# Patient Record
Sex: MALE | Race: WHITE | NOT HISPANIC OR LATINO | Employment: FULL TIME | URBAN - METROPOLITAN AREA
[De-identification: names, ages, dates, MRNs, and addresses within clinical notes are randomized per-mention and may not be internally consistent; named-entity substitution may affect disease eponyms.]

---

## 2024-12-03 ENCOUNTER — APPOINTMENT (OUTPATIENT)
Dept: CARDIOLOGY | Facility: HOSPITAL | Age: 49
End: 2024-12-03
Payer: COMMERCIAL

## 2024-12-03 ENCOUNTER — OFFICE VISIT (OUTPATIENT)
Dept: URGENT CARE | Age: 49
End: 2024-12-03
Payer: COMMERCIAL

## 2024-12-03 ENCOUNTER — HOSPITAL ENCOUNTER (EMERGENCY)
Facility: HOSPITAL | Age: 49
Discharge: HOME | End: 2024-12-03
Attending: STUDENT IN AN ORGANIZED HEALTH CARE EDUCATION/TRAINING PROGRAM
Payer: COMMERCIAL

## 2024-12-03 ENCOUNTER — APPOINTMENT (OUTPATIENT)
Dept: RADIOLOGY | Facility: HOSPITAL | Age: 49
End: 2024-12-03
Payer: COMMERCIAL

## 2024-12-03 VITALS
TEMPERATURE: 98.1 F | WEIGHT: 227 LBS | OXYGEN SATURATION: 96 % | SYSTOLIC BLOOD PRESSURE: 123 MMHG | HEIGHT: 77 IN | DIASTOLIC BLOOD PRESSURE: 90 MMHG | BODY MASS INDEX: 26.8 KG/M2 | RESPIRATION RATE: 18 BRPM | HEART RATE: 68 BPM

## 2024-12-03 VITALS
WEIGHT: 233.03 LBS | DIASTOLIC BLOOD PRESSURE: 97 MMHG | TEMPERATURE: 98.5 F | RESPIRATION RATE: 15 BRPM | HEART RATE: 74 BPM | BODY MASS INDEX: 27.63 KG/M2 | OXYGEN SATURATION: 96 % | SYSTOLIC BLOOD PRESSURE: 134 MMHG

## 2024-12-03 DIAGNOSIS — F41.9 ANXIOUS MOOD: Primary | ICD-10-CM

## 2024-12-03 DIAGNOSIS — R45.1 RESTLESS: Primary | ICD-10-CM

## 2024-12-03 LAB
ALBUMIN SERPL BCP-MCNC: 4.4 G/DL (ref 3.4–5)
ALP SERPL-CCNC: 58 U/L (ref 33–120)
ALT SERPL W P-5'-P-CCNC: 31 U/L (ref 10–52)
ANION GAP SERPL CALC-SCNC: 10 MMOL/L (ref 10–20)
APPEARANCE UR: CLEAR
AST SERPL W P-5'-P-CCNC: 19 U/L (ref 9–39)
BASOPHILS # BLD AUTO: 0.05 X10*3/UL (ref 0–0.1)
BASOPHILS NFR BLD AUTO: 1.1 %
BILIRUB SERPL-MCNC: 1 MG/DL (ref 0–1.2)
BILIRUB UR STRIP.AUTO-MCNC: NEGATIVE MG/DL
BUN SERPL-MCNC: 11 MG/DL (ref 6–23)
CALCIUM SERPL-MCNC: 9.8 MG/DL (ref 8.6–10.3)
CARDIAC TROPONIN I PNL SERPL HS: 3 NG/L (ref 0–20)
CARDIAC TROPONIN I PNL SERPL HS: 3 NG/L (ref 0–20)
CHLORIDE SERPL-SCNC: 105 MMOL/L (ref 98–107)
CO2 SERPL-SCNC: 27 MMOL/L (ref 21–32)
COLOR UR: ABNORMAL
CREAT SERPL-MCNC: 0.99 MG/DL (ref 0.5–1.3)
EGFRCR SERPLBLD CKD-EPI 2021: >90 ML/MIN/1.73M*2
EOSINOPHIL # BLD AUTO: 0.1 X10*3/UL (ref 0–0.7)
EOSINOPHIL NFR BLD AUTO: 2.1 %
ERYTHROCYTE [DISTWIDTH] IN BLOOD BY AUTOMATED COUNT: 12.8 % (ref 11.5–14.5)
GLUCOSE SERPL-MCNC: 102 MG/DL (ref 74–99)
GLUCOSE UR STRIP.AUTO-MCNC: NORMAL MG/DL
HCT VFR BLD AUTO: 46.7 % (ref 41–52)
HGB BLD-MCNC: 16 G/DL (ref 13.5–17.5)
IMM GRANULOCYTES # BLD AUTO: 0.02 X10*3/UL (ref 0–0.7)
IMM GRANULOCYTES NFR BLD AUTO: 0.4 % (ref 0–0.9)
KETONES UR STRIP.AUTO-MCNC: NEGATIVE MG/DL
LEUKOCYTE ESTERASE UR QL STRIP.AUTO: NEGATIVE
LYMPHOCYTES # BLD AUTO: 2.06 X10*3/UL (ref 1.2–4.8)
LYMPHOCYTES NFR BLD AUTO: 43.9 %
MCH RBC QN AUTO: 30.5 PG (ref 26–34)
MCHC RBC AUTO-ENTMCNC: 34.3 G/DL (ref 32–36)
MCV RBC AUTO: 89 FL (ref 80–100)
MONOCYTES # BLD AUTO: 0.33 X10*3/UL (ref 0.1–1)
MONOCYTES NFR BLD AUTO: 7 %
MUCOUS THREADS #/AREA URNS AUTO: ABNORMAL /LPF
NEUTROPHILS # BLD AUTO: 2.13 X10*3/UL (ref 1.2–7.7)
NEUTROPHILS NFR BLD AUTO: 45.5 %
NITRITE UR QL STRIP.AUTO: NEGATIVE
NRBC BLD-RTO: 0 /100 WBCS (ref 0–0)
PH UR STRIP.AUTO: 7 [PH]
PLATELET # BLD AUTO: 267 X10*3/UL (ref 150–450)
POC FINGERSTICK BLOOD GLUCOSE: 115 MG/DL (ref 70–100)
POTASSIUM SERPL-SCNC: 4.3 MMOL/L (ref 3.5–5.3)
PROT SERPL-MCNC: 6.8 G/DL (ref 6.4–8.2)
PROT UR STRIP.AUTO-MCNC: NEGATIVE MG/DL
RBC # BLD AUTO: 5.24 X10*6/UL (ref 4.5–5.9)
RBC # UR STRIP.AUTO: ABNORMAL /UL
RBC #/AREA URNS AUTO: ABNORMAL /HPF
SODIUM SERPL-SCNC: 138 MMOL/L (ref 136–145)
SP GR UR STRIP.AUTO: 1.02
T4 FREE SERPL-MCNC: 0.89 NG/DL (ref 0.61–1.12)
TSH SERPL-ACNC: 1.69 MIU/L (ref 0.44–3.98)
UROBILINOGEN UR STRIP.AUTO-MCNC: NORMAL MG/DL
WBC # BLD AUTO: 4.7 X10*3/UL (ref 4.4–11.3)
WBC #/AREA URNS AUTO: ABNORMAL /HPF

## 2024-12-03 PROCEDURE — 81001 URINALYSIS AUTO W/SCOPE: CPT | Performed by: PHYSICIAN ASSISTANT

## 2024-12-03 PROCEDURE — 93005 ELECTROCARDIOGRAM TRACING: CPT

## 2024-12-03 PROCEDURE — 84075 ASSAY ALKALINE PHOSPHATASE: CPT | Performed by: PHYSICIAN ASSISTANT

## 2024-12-03 PROCEDURE — 84484 ASSAY OF TROPONIN QUANT: CPT | Performed by: PHYSICIAN ASSISTANT

## 2024-12-03 PROCEDURE — 84439 ASSAY OF FREE THYROXINE: CPT | Performed by: STUDENT IN AN ORGANIZED HEALTH CARE EDUCATION/TRAINING PROGRAM

## 2024-12-03 PROCEDURE — 71046 X-RAY EXAM CHEST 2 VIEWS: CPT

## 2024-12-03 PROCEDURE — 2500000001 HC RX 250 WO HCPCS SELF ADMINISTERED DRUGS (ALT 637 FOR MEDICARE OP): Performed by: PHYSICIAN ASSISTANT

## 2024-12-03 PROCEDURE — 36415 COLL VENOUS BLD VENIPUNCTURE: CPT | Performed by: PHYSICIAN ASSISTANT

## 2024-12-03 PROCEDURE — 36415 COLL VENOUS BLD VENIPUNCTURE: CPT | Performed by: STUDENT IN AN ORGANIZED HEALTH CARE EDUCATION/TRAINING PROGRAM

## 2024-12-03 PROCEDURE — 84443 ASSAY THYROID STIM HORMONE: CPT | Performed by: STUDENT IN AN ORGANIZED HEALTH CARE EDUCATION/TRAINING PROGRAM

## 2024-12-03 PROCEDURE — 71046 X-RAY EXAM CHEST 2 VIEWS: CPT | Performed by: RADIOLOGY

## 2024-12-03 PROCEDURE — 85025 COMPLETE CBC W/AUTO DIFF WBC: CPT | Performed by: PHYSICIAN ASSISTANT

## 2024-12-03 PROCEDURE — 99285 EMERGENCY DEPT VISIT HI MDM: CPT | Mod: 25 | Performed by: STUDENT IN AN ORGANIZED HEALTH CARE EDUCATION/TRAINING PROGRAM

## 2024-12-03 RX ORDER — LORAZEPAM 1 MG/1
1 TABLET ORAL ONCE
Status: COMPLETED | OUTPATIENT
Start: 2024-12-03 | End: 2024-12-03

## 2024-12-03 ASSESSMENT — COLUMBIA-SUICIDE SEVERITY RATING SCALE - C-SSRS
6. HAVE YOU EVER DONE ANYTHING, STARTED TO DO ANYTHING, OR PREPARED TO DO ANYTHING TO END YOUR LIFE?: NO
2. HAVE YOU ACTUALLY HAD ANY THOUGHTS OF KILLING YOURSELF?: NO
1. IN THE PAST MONTH, HAVE YOU WISHED YOU WERE DEAD OR WISHED YOU COULD GO TO SLEEP AND NOT WAKE UP?: NO

## 2024-12-03 ASSESSMENT — PAIN DESCRIPTION - PROGRESSION: CLINICAL_PROGRESSION: NOT CHANGED

## 2024-12-03 ASSESSMENT — PAIN SCALES - GENERAL: PAINLEVEL_OUTOF10: 0 - NO PAIN

## 2024-12-03 ASSESSMENT — PAIN - FUNCTIONAL ASSESSMENT: PAIN_FUNCTIONAL_ASSESSMENT: 0-10

## 2024-12-03 NOTE — DISCHARGE INSTRUCTIONS
You were seen in the ED for feeling off with some anxiety component. Your workup in the ED was reassuring. We discussed that your thyroid tests have not come back yet. We will call if they are abnormal. We put in a referral for a primary care doctor, you will be called to help schedule. If you develop any chest pain, shortness of breath, feelings of dizziness/passing out or leg swelling please return to the ED

## 2024-12-03 NOTE — PROGRESS NOTES
"Subjective   Patient ID: Beto Schneider is a 49 y.o. male. They present today with a chief complaint of Blood Pressure Check (States top number elevated in 150s-160s last night. Denies dizziness or lightheaded this AM. Has not eaten yet, has only had water).    History of Present Illness  Patient is a 49-year-old male with no reported past medical history presents urgent care today with a concern for elevated blood pressure.  He states yesterday, he was \"jittery\", \"restless\", and \"just did not feel right\".  He states he took his blood pressure and noted to be 204/130.  He states this made him even more anxious.  He notes tossing and turning all night, not able to fall asleep.  He went to the gym this morning, played basketball and returned home with the same symptoms.  He states he was able to put on some \"soothing music\" which seem to help.  He notes his job is \"pretty crazy right now\" so he is under a lot of stress.  He denies any chest pain, shortness of breath, dizziness, lightheadedness, nausea, vomiting or any other symptoms.  Vitals today are within normal limits.  No other complaints or concerns mention this time.      History provided by:  Patient      Past Medical History  Allergies as of 12/03/2024    (No Known Allergies)       (Not in a hospital admission)         No past medical history on file.    No past surgical history on file.         Review of Systems  Review of Systems   Neurological:         Restless     All other systems reviewed and are negative.                                 Objective    Vitals:    12/03/24 1127   BP: (!) 142/93   Pulse: 73   Resp: 15   Temp: 36.9 °C (98.5 °F)   TempSrc: Oral   SpO2: 98%   Weight: 106 kg (233 lb 0.4 oz)     No LMP for male patient.    Physical Exam  Vitals and nursing note reviewed.   Constitutional:       General: He is not in acute distress.     Appearance: Normal appearance. He is not ill-appearing, toxic-appearing or diaphoretic.   HENT:      Head: " Normocephalic and atraumatic.      Mouth/Throat:      Mouth: Mucous membranes are moist.   Eyes:      Extraocular Movements: Extraocular movements intact.      Conjunctiva/sclera: Conjunctivae normal.      Pupils: Pupils are equal, round, and reactive to light.   Cardiovascular:      Rate and Rhythm: Normal rate and regular rhythm.      Pulses: Normal pulses.      Heart sounds: Normal heart sounds.   Pulmonary:      Effort: Pulmonary effort is normal. No respiratory distress.      Breath sounds: Normal breath sounds. No stridor. No wheezing, rhonchi or rales.   Chest:      Chest wall: No tenderness.   Musculoskeletal:         General: Normal range of motion.      Cervical back: Normal range of motion and neck supple.   Skin:     General: Skin is warm and dry.      Capillary Refill: Capillary refill takes less than 2 seconds.   Neurological:      General: No focal deficit present.      Mental Status: He is alert and oriented to person, place, and time.   Psychiatric:         Mood and Affect: Mood normal.         Behavior: Behavior normal.         Procedures      Assessment/Plan   Allergies, medications, history, and pertinent labs/EKGs/Imaging reviewed by AGUEDA Mcdonough.     Medical Decision Making    Upon assessment patient is alert and oriented.  He is in no acute distress but does appear somewhat restless.  He is answering questions appropriately.  He is afebrile appears well hydrated.  Vitals within normal limits.  Physical exam is completely unremarkable.    Differential includes but not limited to: Anxiety, panic attack, hypertensive crisis, MI, other    EKG ordered and independently reviewed by myself.  Preliminarily interpreted as normal sinus rhythm at a rate of 63.  Normal P axis.  Normal intervals.  No ST elevation or signs of STEMI.  No ectopy.    Blood sugar obtained and noted to be slightly elevated at 115.  Not likely the cause of patient's symptoms.    Orthostatics also obtained with no  appreciable findings.  Readings were consistently in the 130s over 90s throughout testing.  Heart rate also remained relatively unchanged.  Blood pressure assessed in both the left and right arm again without any significant changes.    I discussed all these findings with the patient.  I had advised him that although his EKG and other testing appear relatively normal, the fact that he does not feel right is concerning.  Especially given the fact that no cause has been identified.  I suspect his symptoms are secondary to anxiety however I am unable to definitively rule out cardiac etiology in the setting.  He is agreeable to evaluation in the emergency room but declines ambulance transport.  He states he is not dizzy, lightheaded, experiencing chest pain or shortness of breath and feels he will be safe to drive himself to [facility] [place].  AMA paperwork signed in presence of medical staff.  Patient was discharged in stable condition with expectation that he will go to [facility] emergency room for evaluation upon discharge.                 Orders and Diagnoses  Diagnoses and all orders for this visit:  Restless  -     ECG 12 lead (Clinic Performed)      Medical Admin Record      Follow Up Instructions  No follow-ups on file.    Patient disposition: ED    Electronically signed by AGUEDA Mcdonough  11:33 AM

## 2024-12-03 NOTE — Clinical Note
Discharged instructions gone over with the pt at this time. IV removed and new set of vitals completed. At this time has no further questions will follow up with PCP and knows when to come back.

## 2024-12-03 NOTE — PATIENT INSTRUCTIONS
You are seen urgent care today for complaint of restlessness.  No cause was identified for your symptoms.  Recommended more thorough workup in the emergency room immediately upon discharge from this facility.  Choosing not to do so could result in worsening symptoms up to and including death.

## 2024-12-03 NOTE — ED PROVIDER NOTES
"[facility] ED Note    Date of Service: 12/3/2024  Reason for Visit: Anxiety      Patient History     HPI  Beto Schneider is a 49 y.o. male with no significant past medical history who presents the emergency department for feeling off.  Patient states that over the past 1 to 2 days, he has felt off, anxious, unable to sleep.  He states that he has felt not like himself.  During this time, he measured his blood pressure at home and noted to have a systolic reading of 205.  This did come down to 180 yesterday evening.  Patient states that he has tried working out a little bit, exercising, taking his mind off things but still continues to feel off and not quite like himself.  He denies any specific symptoms such as headache, changes in vision, nausea or vomiting, chest pain or shortness of breath.  He more so describes just feeling off his baseline.  He states this is never happened to him prior.  He gets an annual physical but does not have any additional medical history that he reports.      No past medical history on file.  No past surgical history on file.      Physical Exam     Vitals:    12/03/24 1238 12/03/24 1630   BP: (!) 143/96 123/90   BP Location: Left arm    Patient Position: Sitting    Pulse: 64 68   Resp: 18 18   Temp: 36.7 °C (98.1 °F)    TempSrc: Axillary    SpO2: 98% 96%   Weight: 103 kg (227 lb)    Height: 1.956 m (6' 5\")      General: Age-appropriate male, nontoxic, sitting comfortably in the [facility] no acute distress.  Pulmonary:   Non-labored breathing. Breath sounds clear bilaterally  Cardiac:  Regular rate   Abdomen:  Soft. Non-tender. Non-distended  Musculoskeletal:  No long bone deformity. No peripheral edema   Skin:  Dry, no rashes  Neuro:  Alert and oriented x 4. CN II-XII intact. 5/5 strength in all 4 extremities. Sensation grossly intact to light touch. Speech and mentation normal.  Gait narrow and stable.     Diagnostic Studies     Labs:  Please see EMR for labs obtained during " this patient encounter.    Radiology:  Please see EMR for imaging obtained during this patient encounter.    EKG:  Normal sinus rhythm, ventricular 65.  Normal axis.  Normal LA interval.  Ventricular conduction in a pattern of incomplete right bundle branch block.  No Q waves appreciated, normal ST segments, isolated T wave flattening seen in lead III.  No prior EKG available for comparison.      ED Course and MDM     Beto Schneider is a 49 y.o. male with a history and presentation as described above in HPI.      Upon presentation, the patient was afebrile, well-appearing, with unremarkable vital signs.  Patient presented to the emergency department for feeling off with possible components of anxiety.  Differential diagnosis includes possible hyperthyroidism, possible anxiety, possible cardiac component given patient's nonspecific symptoms with just feeling off with possible weakness and possible infection.  Patient did not have any focal symptoms to suggest infection including absence of fever, no shortness of breath, no nausea or vomiting or abdominal pain.  He just describes feeling off.  Patient's vital signs are normal, he has a normal white count, and appears well, low suspicion for infection causing patient's symptoms today.  There is not appear to be a significant metabolic component as patient's electrolytes are within normal limits and his TSH as well as T4 are within normal limits.  From a cardiac standpoint, patient's workup was reassuring with an EKG without any signs of ischemia or arrhythmia and negative troponin x 2.  Patient's urinalysis was notable hematuria which is usual for him and he knows about it.  Patient left prior to the final results of his TSH and T4, he was instructed he would be called if these are abnormal, these were normal.  Given his nonspecific and nondescript symptoms and otherwise well appearance without any significant abnormalities on exam, patient is appropriate for  discharge.  He will be given a referral to his primary care doctor, will be given return precautions and subsequently discharge.      Impression     Diagnoses as of 12/03/24 2219   Anxious mood        Plan       Discharge, see DCI provided      Xavier Sanchez MD  [facility]         Xavier Sanchez MD  12/03/24 2214

## 2024-12-03 NOTE — ED TRIAGE NOTES
"TRIAGE NOTE   Secondary to high patient volumes and overcrowding, I saw the patient as the Clinician in Triage and performed a brief history and physical exam, established acuity, and ordered appropriate tests to develop basic plan of care. Once ED bed available, patient will subsequently be seen by an [facility]ARINA, resident and/or physician who will independently evaluate the patient. Please see next provider's notes for further details and disposition.      Brief HPI:   Patient is 49-year-old male that denies any significant prior medical history presents to the ED at the referral of urgent care for elevated blood pressure, excessively urinating and feeling \"off\".  He describes this as \"like when you get in a cold car and tense up even though you have a coat on.\"  Last night when he was feeling off he checked his blood pressure and it was 205/130.  He is not sure if his machine was broken.  He said usually blood pressures in the 140s over 90s.  His doctors have been watching it for a few years but he is not on medication.  He denies any chest pain, palpitations, shortness of breath, numbness, tingling, extremity weakness, vision changes, speech changes, abdominal pain.  Notes that he was avoiding every 10 to 15 minutes last night for about 4 hours.  Usually does not have nocturia this frequent.  Did have 2 old fashions and [employer]ke while watching the [employer] game.  Not usually have nocturia following alcohol ingestion.  Denies personal or family history of bleeding clotting disorder, connective tissue disorder.  Had an uncle with heart disease otherwise no family history of heart disease.  I reviewed urgent care note.  He stated that his blood pressure at home yesterday was 204/130.  Orthostatics were obtained at urgent care and he is consistently in the 130s over 90s.  Negative for orthostasis.  There was no blood pressure difference in either arm.  He had normal ECG.  Because they could not rule out cardiac " etiology he was sent to ED for further testing.     Focused Physical exam:   Anxious appearing.  Pulses symmetric.  Van negative.  Smile symmetric.    Plan/MDM:   Cardiac workup, Ativan    Please see subsequent provider note for further details and disposition

## 2024-12-03 NOTE — ED TRIAGE NOTES
Patient presents to ED from  for not feeling well. Patient took his BP at home and SBP read 200s. Patient states he has been restless and excessively urinating. Patient states he was urinating every 10-20 min in a 4 hour period with a normal amount of urine each time. Patient states he has no other urinary symptoms. Patient states he feels like he has had a lot of caffeine but didn't take any. Denies adderall or cocaine use. Patient states he woke up at 0300 and played basketball and doesn't ever do that. He is restless and feels like he is handling the stress well in his life so he doesn't believe this is stress induced.

## 2024-12-04 LAB — HOLD SPECIMEN: NORMAL

## 2024-12-07 LAB
ATRIAL RATE: 65 BPM
P AXIS: 59 DEGREES
P OFFSET: 177 MS
P ONSET: 127 MS
PR INTERVAL: 186 MS
Q ONSET: 220 MS
QRS COUNT: 11 BEATS
QRS DURATION: 92 MS
QT INTERVAL: 420 MS
QTC CALCULATION(BAZETT): 436 MS
QTC FREDERICIA: 431 MS
R AXIS: 63 DEGREES
T AXIS: 45 DEGREES
T OFFSET: 430 MS
VENTRICULAR RATE: 65 BPM